# Patient Record
Sex: FEMALE | Race: WHITE
[De-identification: names, ages, dates, MRNs, and addresses within clinical notes are randomized per-mention and may not be internally consistent; named-entity substitution may affect disease eponyms.]

---

## 2017-12-29 ENCOUNTER — HOSPITAL ENCOUNTER (OUTPATIENT)
Dept: HOSPITAL 69 - ER | Age: 77
Setting detail: OBSERVATION
LOS: 1 days | Discharge: HOME HEALTH | End: 2017-12-30
Attending: INTERNAL MEDICINE | Admitting: INTERNAL MEDICINE
Payer: MEDICARE

## 2017-12-29 LAB
ALBUMIN   *: 3.3 GM/DL (ref 3.4–5)
ALKALINE PHOSPHATASE     *: 97 U/L (ref 50–170)
ALT SERPL-CCNC: 30 U/L (ref 19–67)
ANION GAP SERPL CALC-SCNC: 11 MMOL/L (ref 6.8–13.8)
APTT BLD: 22.7 SECONDS (ref 24–32)
AST SERPL-CCNC: 35 U/L (ref 0–48)
BILIRUB SERPL-MCNC: 0.4 MG/DL (ref 0–1.1)
BUN SERPL-MCNC: 23 MG/DL (ref 3–23)
BUN/CREAT SERPL: 20.4 (ref 9–21.6)
CA. CORRECTED FOR ALBUMIN: 9.8 MG/DL (ref 8.4–10.2)
CALCIUM       *: 9.6 MG/DL (ref 7.9–10.9)
CHLORIDE SERPL-SCNC: 103 MMOL/L (ref 97–106)
CO2 SERPL-SCNC: 28.9 MMOL/L (ref 24–32.6)
GLUCOSE   *: 126 MG/DL (ref 70–110)
HCT VFR BLD CALC: 40.7 % (ref 37–47)
HGB BLD-MCNC: 13.5 GM/DL (ref 12.5–16)
INR PPP: 0.94 INR (ref 0.9–1.1)
MCH RBC QN AUTO: 31.8 PG (ref 27–31)
NRBC#: 0 K/MM3 (ref 0–1)
PLATELET # BLD: 240 K/MM3 (ref 150–450)
POTASSIUM SERPL-SCNC: 3.9 MMOL/L (ref 3.4–4.6)
PROT SERPL-MCNC: 7.5 GM/DL (ref 6.2–8.2)
RBC # BLD AUTO: 4.25 M/MM3 (ref 4.2–5.4)
SODIUM SERPL-SCNC: 139 MMOL/L (ref 132–142)
TROPONIN I SERPL-MCNC: 0.02 NG/ML (ref 0–0.1)
WBC # BLD AUTO: 12.1 K/MM3 (ref 4–10.5)

## 2017-12-29 RX ADMIN — ACETAMINOPHEN SCH MG: 500 TABLET ORAL at 20:05

## 2017-12-29 RX ADMIN — OXYBUTYNIN CHLORIDE SCH MG: 5 TABLET ORAL at 16:46

## 2017-12-29 NOTE — ERNOTE
Chest Pain/Cardiac HPI


Date of Service: 17


Chief Complaint: Chest Pain


Time Seen by Provider: 17 10:01


Source: patient


Immunizations: 





 IMMUNIZATION HX





History of Influenza Vaccine     Yes


Hx Pneumococcal Vaccination      Yes








Allergies/Adverse Reactions: 


Allergies





ciprofloxacin [From Cipro] Allergy (Unknown, Verified 17 09:59)


 


ciprofloxacin HCl [From Cipro] Allergy (Unknown, Verified 17 09:59)


 


sulfamethoxazole [From Bactrim] Allergy (Unknown, Verified 17 09:59)


 


trimethoprim [From Bactrim] Allergy (Unknown, Verified 17 09:59)


 


Sulfa (Sulfonamide Antibiotics) Allergy (Verified 17 09:59)


 








Home Medications: 


HOME MEDICATIONS





Oxybutynin Chloride 5 mg PO TID 13 [Last Taken Unknown]


Aspirin 81 mg PO DAILY 16 [Last Taken Unknown]


Atorvastatin Calcium 40 mg PO DAILY 16 [Last Taken Unknown]


Clopidogrel Bisulfate 75 mg PO DAILY 16 [Last Taken Unknown]


Doc-Q-Lace 100 mg PO BID 16 [Last Taken Unknown]


Oyster Shell Calcium-Magnes Tb 1,250 mg PO DAILY 16 [Last Taken Unknown]


Pantoprazole Sodium 40 mg PO DAILY 16 [Last Taken Unknown]


Tylenol 500 mg PO TID 16 [Last Taken Unknown]


Nitroglycerin [Nitrostat] 0.4 mg SL Q5MIN PRN 17 [Last Taken Unknown]








Narrative: 





chest pain 





Date (Duration): 17


Timing: constant


Severity/Quality: severe


Location: substernal, central


Chest Pain Radiation: no radiation


Activities at Onset: rest


Modifying Factors - Improves: Present: other - tylenol was of no help 


Modifying Factors - Worsens: Present: nitroglycerin, other - helped 


Nitro Today/Relief: 0.4 mg x 1, provided by ED, complete relief


Aspirin Treatment Today: no aspirin today - patient is on blood thinner 





Review of Systems





- Review of Systems


Constitutional: Present: no symptoms reported


EYE: Present: no symptoms reported


ENT: Present: no symptoms reported


Respiratory: Present: no symptoms reported


Cardiology: Present: chest pain - anterior chest 


Gastrointestinal/Abdominal: Present: no symptoms reported, other - has chronic 

mild incontinence, no dysuria 


Genitourinary: Present: no symptoms reported


Musculoskeletal: Present: no symptoms reported


Skin: Present: no symptoms reported


Neurological: Present: See HPI, other - has hx of Stroke with residual weaknes 

of lower ext and needs walker and wheelchair to get around at home 


Endocrine: Present: no symptoms reported


Hematologic/Lymphatic: Present: easy bruising, other - currently on blood 

thinner 


Psych: Present: anxiety, other - hx of drinking beer daily 


All Other Systems: All systems neg except as marked





- Narrative


Narrative: 





all reviewed today , pmh, psh, allergies, meds, sh, and fam hx. 





- Patient's Past Medical History


Patient History - Medical: Other - hx of stroke in 2016 with residual weakness 


Patient History - Cardiac/Respiratory: COPD, Valvular Heart Disease - chronic 

tobacco 1ppd daily greater than 50 years


Patient History - Cancer: No Hx of Cancer


Patient History - Surgical Procedures: Colonoscopy, T & A


Patient History - Other: None


LMP (females 10-50): Menopausal





- Family History


  ** Sister


Family History - Medical: Other - mother  of pulmonary embolus 





- Social History


Living Situations: home


Psych History: No pertinent hx


Alcohol Use: none


Drug Use: none





- Immunizations


Hx Pneumococcal Vaccination: Yes


History of Influenza Vaccine: Yes





Physical Exam





- Physical Exam


General Appearance: Present: wd/wn, alert


Head Exam: Present: normal inspection, no evidence of injury, no tenderness w 

palpation


Ears, Nose, Throat: Present: normal ENT inspection, normal pharynx


Neck: Present: normal inspection, nontender


Respiratory: Present: no respiratory distress, decreased breath sounds, rhonchi


Cardiovascular/Chest: Present: regular rate, rhythm, no murmur, normal 

peripheral pulses


Peripheral Pulses: N=norm/S=strong/W=weak/B=bound/A=absent: Carotid (R): Normal

, Carotid (L): Normal, Radial (R): Normal, Radial (L): Normal


Gastrointestinal/Abdominal: Present: normal bowel sounds, nontender, 

nondistended


Rectal Exam: Present: deferred


Pelvic Exam: Present: deferred


Back Exam: Present: normal inspection, normal range of motion, no vertebral 

tenderness


Extremity Exam: Present: normal inspection, non-tender


Neurological Exam: Present: alert, oriented, normal mood/affect, motor weakness 

- right lower ext 


Skin Exam: Present: normal color, warm/dry


Lymphatic Exam: Present: no adenopathy





ED Progress





- Results and Orders


Patient's Lab Results:: I have reviewed the patient's lab results.


Results and Orders: 





 Laboratory Tests











  17





  10:00 10:00 10:00


 


WBC  12.1 H  


 


RBC  4.25  


 


Hgb  13.5  


 


Hct  40.7  


 


MCV  95.8  


 


MCH  31.8 H  


 


MCHC  33.2  


 


RDW  14.4 H  


 


Plt Count  240  


 


MPV  10.4 H  


 


Immature Gran % (Auto)  0.20  


 


Immature Gran # (Auto)  0.03  


 


Neutrophils %  78.1 H  


 


Lymphocytes %  10.2 L  


 


Monocytes %  10.6 H  


 


Eosinophils %  0.7  


 


Basophils %  0.2  


 


Nucleated RBC %  0.0  


 


Neutrophils #  9.4 H  


 


Lymphocytes #  1.2 L  


 


Monocytes #  1.3 H  


 


Eosinophils #  0.1  


 


Absolute Basophils  0.0  


 


PT   9.4 


 


INR (Anticoag Therapy)   0.94 


 


PTT ([place])   22.7 L 


 


Sodium    139


 


Plasma Sodium    139


 


Potassium    3.9


 


Chloride    103


 


Carbon Dioxide    28.9


 


Anion Gap    11.0


 


BUN    23


 


Creatinine    1.13


 


Est GFR (Non-Af Amer)    50 L


 


BUN/Creatinine Ratio    20.4


 


Random Glucose    126 H


 


Calcium    9.6


 


Calcium Adj for Albumin    9.8


 


Total Bilirubin    0.4


 


AST    35


 


ALT    30


 


Alkaline Phosphatase    97


 


Troponin I    0.020


 


B-Natriuretic Peptide    2030 H


 


Total Protein    7.5


 


Albumin    3.3 L














- Vital Signs


Patient's Vital Signs:: I have reviewed the patient's vital signs.


Vital Signs: 





 Vital Signs











  17





  09:51


 


Temperature 36.4 C L


 


Pulse Rate 85


 


Respiratory 22 H





Rate 


 


Blood Pressure 142/80


 


O2 Sat by Pulse 98





Oximetry 














- EKG


EKG: NSR





- X-Ray


  ** X-Ray #1


X-Ray: chest


Interpretation: Reviewed by me


X-ray Comments: 





 [facility]


                                                PATIENT  RADIOLOGY  STUDY  

REPORT


========================================================================


Patient  MRN:  [id number] Patient  Name:GILMA HALEY


  YOB: 1940 Sex:  F


                              


            Order  Number:  [id number]              Unique  Exam  ID:  [id number]


        Exam  Requested:  CXRPALAT  -  Chest  PA      Lateral                  *


        Date  Scheduled:  2017  10:25  AM                Study  Priority:

  





Requesting  Service:          Requesting  Physician:  Reyes, Pascuala





      Reason  for  Exam:  Chest  Pain





Radiological  Report  :  


---------------------------------------------------------------------





  


  [place] [address]    


  Phone:  [phone]    





NAME:                  GILMA HALEY    :    1940      


ACCT  #:              [id number]    MR  #:  [id number]      


    CC:                      Pascuala Reyes DO  LOC:    ER    


ADM  DATE:                  





_____________________________________________________________________________  

  


  X-RAY  REPORT    


    [id number]  RAD/Chest  PA      Lateral                  *      


Exam  Date:  2017  10:25    


  Ordering  Physician:  Pascuala Reyes      





  HISTORY:      


  Chest  Pain      


  Additional  history  from  technologist:  Chest  pain  getting  worse  in  

the  last  couple  days.    





  TECHNIQUE:      


  PA  and  lateral  views  of  the  chest  were  obtained.  2  images.    





  COMPARISONS:      


  2016    





  FINDINGS:    





  Chest  PA      Lateral                  *    





  Hyperinflated  lung  volumes,  with  hyperlucent  lungs.        


  No  consolidation  or  mass.        


  No  pneumothorax  or  pleural  fluid  collections.        


  Cardiac  size  within  normal  limits,  and  overall  grossly  unchanged.  

Mild  tortuosity  of  the  thoracic  


aorta,  stable.  Patient  has  a  large  hiatal  hernia  with  a  significant  

portion  of  the  stomach  


projecting  posterior  to  the  heart.        


  Trachea  is  in  normal  position.        


  Bones  show  degenerative  changes  of  the  spine.      


  Decreased  mineralization  of  bone  suggestive  of  underlying  osteopenia  

or  osteoporosis.    





  IMPRESSION:    


  1.    Findings  compatible  with  acute  or  chronic  bronchitis  versus  

reactive  airways  disease.  Also  


consider  COPD/emphysema,  if  the  patient  has  history  of  smoking.    


  2.    Large  hiatal  hernia.    


  3.    Additional  comments  are  as  above.    





  Electronically  signed  by  Maik Osman M.D..    





Maik Osman MD    





Dict:        17  1028    


Typed:      17  1028/    





    17    1030    


    17    1033    








- CT/Ultrasound


CT/Ultrasound Narrative: 





CTA :   [facility]


                                                PATIENT  RADIOLOGY  STUDY  

REPORT


========================================================================


Patient  MRN:  [id number] Patient  Name:GILMA HALEY


  YOB: 1940 Sex:  F


                              


            Order  Number:  [id number]              Unique  Exam  ID:  [id number]


        Exam  Requested:  ANGIOCHES  -  CTA  Chest


        Date  Scheduled:  2017  12:54  PM                Study  Priority:

  





Requesting  Service:          Requesting  Physician:  Reyes, Pascuala





      Reason  for  Exam:  rule  out  pe  





Radiological  Report  :  


---------------------------------------------------------------------





  


  [place] [address]    


  Phone:  [phone]    





NAME:                  GILMA HALEY    :    1940      


ACCT  #:              [id number]    MR  #:  [id number]      


    CC:                      Pascuala Reyes DO  LOC:    ER    


ADM  DATE:                  





_____________________________________________________________________________  

  


  X-RAY  REPORT    


    [id number]  CT/CTA  Chest      


Exam  Date:  2017  12:54    


  Ordering  Physician:  Pascuala Reyes      





  HISTORY:      


  Chest  pain.    





  TECHNIQUE:      


  Multiple  thin-section  contrast-enhanced  axial  CT  images  of  the  chest  

were  obtained  after  rapid  


infusion  of  intravenous  contrast  material,  according  to  pulmonary  

angiography  protocol.  Coronal  


maximal  intensity  projection  (MIP)  images  were  also  submitted  for  

interpretation.      





  Dose  reduction  techniques  using  the  adjustment  of  the  mA  and/or  kV  

according  to  patient  size  and/


or  use  of  AEC  or  iterative  reconstruction  were  used  in  the  

acquisition  of  this  exam.    





  COMPARISONS:  2015.    





  FINDINGS:    





  CTA  Chest:      





  Pulmonary  Arteries:    


  Diagnostic  Quality  (for  pulmonary  arteries):  Opacification  of  the  

pulmonary  arterial  branches  is  


adequate.    The  breath  hold  is  adequate.  There  is  streak  artifact  

from  contrast  material  within  the  


superior  vena  cava,  which  affects  the  adjacent  pulmonary  arteries.    


  No  definite  filling  defects  are  noted  to  suggest  pulmonary  

thromboembolism.    


  No  definite  axial  CT  image  findings  of  right  heart  strain.    





  Aorta:  Opacification  of  the  thoracic  aorta  is  suboptimal  for  

angiographic  evaluation  but  no  


definite  focal  finding  is  seen.  Multiple  artifacts  related  to  cardiac  

motion  and  streak  artifact  


from  contrast  in  the  adjacent  superior  vena  cava  noted.    





  Mediastinum:  No  mediastinal  mass  or  lymphadenopathy  noted.  Incidental  

note  of  a  large  hiatal  hernia


,  with  most  of  the  stomach  seen  within  the  chest.    





  Heart:  No  significant  pericardial  effusion  noted.  There  is  mild  to  

moderate  cardiomegaly  noted,  


with  cardiac  chamber  enlargement,  especially  the  left  and  right  atria.

    





  Lung  Parenchyma:  No  new  consolidation  or  focal  mass  identified.  

Emphysematous  changes  and  


bilateral  basilar  opacities  noted  suggestive  of  atelectasis.  There  is  

a  peripheral  2  x  2  cm  


scarlike  opacity  in  the  medial  segment  of  the  right  middle  lobe,  

stable.    





  Central  Airways:  The  trachea  and  bronchi  grossly  patent.    





  Pleural  Spaces:  No  pneumothorax  or  pleural  effusions  present.    





  Bones:  There  is  a  new  T7  compression  fracture,  with  no  definite  

signs  of  retropulsion  of  the  


posterior  cortex.  There  is  underlying  osteopenia/osteoporosis  suggested.  

Degenerative  changes  of  


the  thoracolumbar  spine  noted.    





  Chest  Wall/Axillae:  Anterior  chest  wall  is  grossly  unremarkable.  

Axillary  regions  are  also  grossly  


normal.    





  Upper  Abdomen:  Visualized  portions  of  the  abdomen  demonstrates  

probable  cysts  of  the  liver,  


partially  visualized,  but  grossly  stable.  Bilateral  low  density  nodular

  lesions  of  the  adrenal  


glands,  also  stable  most  likely  adenomas.    





  IMPRESSION:      


  1.  Negative  for  acute  pulmonary  thromboembolism.    


  2.  Suboptimal  opacification  of  the  thoracic  aorta  for  angiographic  

evaluation  without  obvious  


acute  findings.  Potential  subtle  aortic  intimal  tear/dissection  cannot  

be  entirely  excluded.    


  3.  Emphysematous  changes  of  the  lungs.    


  4.  Stable  scarlike  opacity  of  the  medial  segment  right  middle  lobe.

  Consider  continued  follow-up/


surveillance.    


  5.  Cardiomegaly,  with  enlargement  of  the  cardiac  chambers,  especially

  the  atria.    


  6.  T7  compression  fracture,  of  indeterminate  age.  It  is  new  since  

.  Correlate  clinically  for  


acute  versus  subacute/chronic  fracture  as  a  potential  cause  of  chest  

pain/back  pain.  Consider  


insufficiency  versus  pathologic  fracture.  Consider  further  evaluation  by

  nonemergent  thoracic  spine  


MRI.    


  7.  Large  hiatal  hernia  with  most  of  the  stomach  within  the  chest. 

   


  8.  Additional  comments  are  as  above.    





  Electronically  signed  by  Maik Osman M.D..    





Maik Osman MD    





Dict:        17  1257    


Typed:      17  1257/    





    17    1311    


    17    1314    

















,        





------------------------------------------------------------------------


                              Approved  by:  MAIK OSMAN


                          Approval  Date:  2017  


                          Approval  Time:  12:57  PM  





THIS  REPORT  WAS  RECEIVED  FROM  THE  [facility]  [facility]


-----------------------------------------------------------------------





- Progress/Reassessment


Chief Complaint: Chest Pain


Progress:: Improved - down to just a discomfort





- Transfer of Care


Receiving Physician: Lucia Pardo


Pending Results: CT/MRI results - cta NEG FOR PE 


Expected Disposition: Admit





Plan





- Plan


Plan: 





Patient is high risk for PE, strong family hx and will need admission for chest 

pain rule out 





Patient is pending a CTA for rule out of PE, high risk patient 2 day hx of sob 

and smokes 1ppd 





cta is neg for PE but she has a thoracic compression fracture at t5 which is 

new since  and 


patient was notified of this finding on ct, and that there is a spot on her 

lungs concerning that will need followup 


as an outpatient 





Departure


Clinical Impression: 


Chest pain


Qualifiers:


 Chest pain type: chest pain on breathing Qualified Code(s): R07.1 - Chest pain 

on breathing





Thoracic compression fracture


Qualifiers:


 Encounter type: sequela Fracture type: closed Qualified Code(s): S22.000S - 

Wedge compression fracture of unspecified thoracic vertebra, sequela





COPD (chronic obstructive pulmonary disease)


Qualifiers:


 COPD type: emphysema Emphysema type: panlobular Qualified Code(s): J43.1 - 

Panlobular emphysema








- Departure


Disposition: [facility]


Condition: Good

## 2017-12-29 NOTE — HP
Chief Complaint





- Chief Complaint


Date of Service: 17


Time of Service: 16:17


Chief Complaint: chest pain/back pain/neck pain


History of Present Illness: 





Tiffanie Mcgarry, is a 77-year-old white female, patient of Dr. Adams, with 

previous medical history of CVA, nonruptured cerebral aneurysm, degenerative 

disc disease, hypertension, hyperlipidemia, chronic smoking, who was admitted 

on 2017 because of sudden acute chest pain, back pain and neck pain.  One 

day prior to admission the patient was in her usual self until she suddenly 

developed chest pain, back pain, neck pain, and shoulder pain. She said it was 

over more than 10 over 10,  nonrelenting, stabbing in character,  not increased 

with activity, not associated with N/V.  She took her usual Tylenol arthritis 

and he did not give any relief and so she went or emergency room today. Her EKg 

showed NSR, troponin was WNL, CXR showed no acute cardiopulmonary findings 

except for hiatal hernia and COPD changes. She was then admitted for further 

workup. Her CTA did not show P.E. or dissection but showed T 7 compression 

fracture of indeterminate age. She deneid any h/o trauma. 





- Patient's Past Medical History


Patient History - Medical: Other - hx of stroke in 2016 with residual weakness 


Patient History - Cardiac/Respiratory: COPD, Valvular Heart Disease - chronic 

tobacco 1ppd daily greater than 50 years


Patient History - Cancer: No Hx of Cancer


Patient History - Surgical Procedures: Colonoscopy, T & A


Patient History - Other: None


LMP (females 10-50): Menopausal





- Family History


  ** Sister


Family History - Medical: Other - mother  of pulmonary embolus 


Family History - Cardiac/Respiratory: Asthma, Hypertension


Family History - Cancer: No pertinent family hx





  ** Mother


Family History - Medical: , No pertinent hx


Family History - Cardiac/Respiratory: Pulmonary Embolism


Family History - Cancer: No pertinent family hx





  ** Father


Family History - Medical: , Diabetes Type 2 Insulin Dependent


Family History - Cardiac/Respiratory: History Unknown


Family History - Cancer: No pertinent family hx





- Social History


Living Situations: home


Psych History: No pertinent hx


Smoking Status: Current every day smoker


Have you smoked in the past 12 months: Yes


Do you dip or chew tobacco: No


Smoking Start Date: 62


Patient requests Smoking Cessation Consult: No


Initiate information on Smoking Cessation: Yes


Alcohol Use: none


Drug Use: none





- Immunizations


Hx Pneumococcal Vaccination: Yes


History of Influenza Vaccine: Yes





Review Of Systems (GEN)





- Review of Systems


Generalized/Overall Review: Absent: Weakness, Chills, Fever


Respiratory: Present: Cough, Shortness of Breath


Cardiac: Present: Chest Pain.  Absent: Edema, Palpitations


Abdominal: Absent: Nausea, Vomiting


Genitourinary: Absent: Urgency, Frequency


Musculoskeletal: Present: Joint Pain - shoulder, Back Pain, Neck Pain


Immunizations: 


 IMMUNIZATION HX





History of Influenza Vaccine     Yes


Hx Pneumococcal Vaccination      Yes








Allergies/Adverse Reactions: 


 Allergies











Allergy/AdvReac Type Severity Reaction Status Date / Time


 


ciprofloxacin [From Cipro] Allergy Unknown  Verified 17 16:11


 


ciprofloxacin HCl Allergy Unknown  Verified 17 16:11





[From Cipro]     


 


sulfamethoxazole Allergy Unknown  Verified 17 16:11





[From Bactrim]     


 


trimethoprim [From Bactrim] Allergy Unknown  Verified 17 16:11


 


Sulfa (Sulfonamide Allergy   Verified 17 16:11





Antibiotics)     











Home Medications: 


HOME MEDICATIONS





Oxybutynin Chloride 5 mg PO TID 13 [Last Taken Unknown]


Acetaminophen [Tylenol] 500 mg PO BID 17 [Last Taken Unknown]


Aspirin [Aspirin EC] 81 mg PO DAILY 17 [Last Taken Unknown]


Atorvastatin Calcium [Lipitor] 40 mg PO HS 17 [Last Taken Unknown]


Calcium Carbonate/Vitamin D3 [Oyster Shell Calcium-Vit D Tab] 1 each PO DAILY  [Last Taken Unknown]


Clopidogrel Bisulfate [Plavix] 75 mg PO DAILY 17 [Last Taken Unknown]


Docusate Sodium [Doc-Q-Lace] 100 mg PO BID PRN 17 [Last Taken Unknown]


Multivitamin [Multivitamins] 1 tab PO DAILY 17 [Last Taken Unknown]


Pantoprazole Sodium 40 mg PO DAILY 17 [Last Taken Unknown]











Exam





- Exam


Vital Signs: 


 Vital Signs - Last Taken











Temp  36.7 C   17 14:40


 


Pulse  93   17 14:40


 


Resp  20   17 14:40


 


BP  156/74   17 14:40


 


Pulse Ox  97   17 14:40











Constitutional: Present: Alert, Oriented x3, Cooperative, Elderly, Thin and 

frail


ENT Exam: Present: hearing grossly normal


Eye Exam: bilateral eye: normal inspection, PERRL, EOMI


Neck: Present: supple


Back Exam: Present: vertebral tenderness


Respiratory: Present: decreased breath sounds, No rales, No wheezing


Cardiovascular/Chest: Present: regular rate, rhythm, no JVD, no murmur


Abdomen: Present: Normal bowel sounds, soft, nontender, nondistended


Extremity: Present: no pedal edema, no calf tenderness


Diagnostic Studies: 


 Laboratory Results











WBC  12.1 K/mm3 (4.0-10.5)  H  17  10:00    


 


RBC  4.25 M/mm3 (4.2-5.4)   17  10:00    


 


Hgb  13.5 gm/dL (12.5-16.0)   17  10:00    


 


Hct  40.7 % (37.0-47.0)   17  10:00    


 


MCV  95.8 fl ()   17  10:00    


 


MCH  31.8 pg (27-31)  H  17  10:00    


 


MCHC  33.2 g/dl (32-36)   17  10:00    


 


RDW  14.4 % (11.5-14.0)  H  17  10:00    


 


Plt Count  240 K/mm3 (150-450)   17  10:00    


 


MPV  10.4 fl (6.0-9.5)  H  17  10:00    


 


Immature Gran % (Auto)  0.20 % (0.001-0.429)   17  10:00    


 


Immature Gran # (Auto)  0.03 K/mm3 (0.000-0.0310)   17  10:00    


 


Neutrophils %  78.1 % (42-75.0)  H  17  10:00    


 


Lymphocytes %  10.2 % (20-51)  L  17  10:00    


 


Monocytes %  10.6 % (0.0-9)  H  17  10:00    


 


Eosinophils %  0.7 % (0.0-3.0)   17  10:00    


 


Basophils %  0.2 % (0.0-1.0)   17  10:00    


 


Nucleated RBC %  0.0 k/mm3 (0-1)   17  10:00    


 


Neutrophils #  9.4 K/mm3 (1.3-6.0)  H  17  10:00    


 


Lymphocytes #  1.2 k/mm3 (1.5-3.5)  L  17  10:00    


 


Monocytes #  1.3 k/mm3 (0.0-1.0)  H  17  10:00    


 


Eosinophils #  0.1 k/mm3 (0.0-0.7)   17  10:00    


 


Absolute Basophils  0.0 k/mm3 (0.0-0.1)   17  10:00    


 


PT  9.4 Seconds (9.0-11.0)   17  10:00    


 


INR (Anticoag Therapy)  0.94 INR (0.90-1.10)   17  10:00    


 


PTT (Treva)  22.7 Seconds (24-32)  L  17  10:00    


 


Sodium  139 mmol/L (132-142)   17  10:00    


 


Plasma Sodium  139 mmol/L (130-142)   17  10:00    


 


Potassium  3.9 mmol/L (3.4-4.6)   17  10:00    


 


Chloride  103 mmol/L ()   17  10:00    


 


Carbon Dioxide  28.9 mmol/L (24-32.6)   17  10:00    


 


Anion Gap  11.0 mmol/L (6.8-13.8)   17  10:00    


 


BUN  23 mg/dL (3-23)   17  10:00    


 


Creatinine  1.13 mg/dL (0.4-1.4)   17  10:00    


 


Est GFR (Non-Af Amer)  50 mL/min ()  L  17  10:00    


 


BUN/Creatinine Ratio  20.4  (9.0-21.6)   17  10:00    


 


Random Glucose  126 mg/dL ()  H  17  10:00    


 


Calcium  9.6 mg/dL (7.9-10.9)   17  10:00    


 


Calcium Adj for Albumin  9.8 mg/dL (8.4-10.2)   17  10:00    


 


Total Bilirubin  0.4 mg/dL (0.0-1.1)   17  10:00    


 


AST  35 U/L (0-48)   17  10:00    


 


ALT  30 U/L (19-67)   17  10:00    


 


Alkaline Phosphatase  97 U/L ()   17  10:00    


 


Troponin I  0.020 ng/ml (0.00-0.10)   17  10:00    


 


B-Natriuretic Peptide  2030 pg/mL (5-550)  H  17  10:00    


 


Total Protein  7.5 gm/dL (6.2-8.2)   17  10:00    


 


Albumin  3.3 gm/dl (3.4-5.0)  L  17  10:00    














Assessment/Plan





- Assessment/Plan


(1) Chest pain


Assessment: 


likely due to T7 compression fracture as associated with back, neck pain, 

shoulder pain. will follow up with a second troponin and EKG. it is possible 

that it can be due to her severe hiatal hernia but less likely. no clear cut 

aortic dissection.


Problem: Acute   


Qualifiers: 


   Chest pain type: unspecified   Qualified Code(s): R07.9 - Chest pain, 

unspecified   





(2) Thoracic compression fracture


Assessment: 


T7 likely pathological from osteoporosis. will do MRI for age determination. 


Problem: Acute   


Qualifiers: 


   Encounter type: sequela   Fracture type: closed   Qualified Code(s): 

S22.000S - Wedge compression fracture of unspecified thoracic vertebra, sequela

   





(3) Osteoporosis


Problem: Chronic   





(4) Elevated brain natriuretic peptide (BNP) level


Assessment: 


likely due to her COPD


Problem: Acute   





(5) Hiatal hernia


Problem: Acute   





(6) Aneurysm, cerebral, nonruptured


Problem: Chronic   





(7) Hypertension


Problem: Chronic   


Qualifiers: 


   Hypertension type: essential hypertension   Qualified Code(s): I10 - 

Essential (primary) hypertension   





(8) Hyperlipidemia


Problem: Chronic   





(9) PAD (peripheral artery disease)


Problem: Chronic   





(10) History of CVA in adulthood


Problem: Chronic

## 2017-12-30 VITALS — DIASTOLIC BLOOD PRESSURE: 77 MMHG | SYSTOLIC BLOOD PRESSURE: 123 MMHG

## 2017-12-30 RX ADMIN — OXYBUTYNIN CHLORIDE SCH MG: 5 TABLET ORAL at 08:28

## 2017-12-30 RX ADMIN — HYDROCODONE BITARTRATE AND ACETAMINOPHEN PRN EACH: 5; 325 TABLET ORAL at 14:28

## 2017-12-30 RX ADMIN — HYDROCODONE BITARTRATE AND ACETAMINOPHEN PRN EACH: 5; 325 TABLET ORAL at 06:52

## 2017-12-30 RX ADMIN — OXYBUTYNIN CHLORIDE SCH MG: 5 TABLET ORAL at 14:29

## 2017-12-30 RX ADMIN — ACETAMINOPHEN SCH: 500 TABLET ORAL at 08:29

## 2017-12-30 NOTE — DS
(1) Chest pain


Diagnosis(s): 


AMI ruled out.


Problem: Acute   


  QualifierTitle:    Chest pain type: unspecified   Qualified Code(s): R07.9 - 

Chest pain, unspecified   





(2) Thoracic compression fracture


Diagnosis(s): 


T7 old compression


Problem: Acute   


  QualifierTitle:    Encounter type: sequela   Fracture type: closed   

Qualified Code(s): S22.000S - Wedge compression fracture of unspecified 

thoracic vertebra, sequela   





(3) Osteoporosis


Problem: Chronic   





(4) Elevated brain natriuretic peptide (BNP) level


Problem: Acute   





(5) Hiatal hernia


Problem: Acute   





(6) Aneurysm, cerebral, nonruptured


Problem: Chronic   





(7) Hypertension


Problem: Chronic   


  QualifierTitle:    Hypertension type: essential hypertension   Qualified Code(

s): I10 - Essential (primary) hypertension   





(8) Hyperlipidemia


Problem: Chronic   





(9) PAD (peripheral artery disease)


Problem: Chronic   





(10) History of CVA in adulthood


Problem: Chronic   


Description of Stay: 


Tiffanie Mcgarry, is a 77-year-old white female, patient of Dr. Adams, with 

previous medical history of CVA, nonruptured cerebral aneurysm, degenerative 

disc disease, hypertension, hyperlipidemia, chronic smoking, who was admitted 

on 12/29/2017 because of sudden acute chest pain, back pain and neck pain.  One 

day prior to admission the patient was in her usual self until she suddenly 

developed chest pain, back pain, neck pain, and shoulder pain. She said it was 

over more than 10 over 10,  nonrelenting, stabbing in character,  not increased 

with activity, not associated with N/V.  She took her usual Tylenol arthritis 

and he did not give any relief and so she went or emergency room today. Her EKg 

showed NSR, troponin was WNL, CXR showed no acute cardiopulmonary findings 

except for hiatal hernia and COPD changes. She was then admitted for further 

workup. Her CTA did not show P.E. or dissection but showed T 7 compression 

fracture of indeterminate age. She denied any h/o trauma. We did an MRI and it 

showed no edema and likely an old fracture. Her pain was better controlled with 

Norco. She was ruled out for AMI. Will discharge patient today and will leave 

it up to her PCP if he wants to schedule an outpatient stress test. 








Procedures Performed: none


Discharge Disposition: Home self care


Disposition: Home self-care


Condition: Good


Discharge Diet: Low salt


Problem Oriented Discharge Instructions to Patient/Family:  Spinal Compression 

Fracture, Chest Pain Observation


Additional Patient Instructions (free text): 


TCM appointment unless nursing home discharge. Thank you! Jazmyne @ [phone]. 

Follow up with her PCP- Dr Adams next week.1-9-18 @ 1:00pm.


Has [facility] ongoing, please call and fax discharge orders to 

[facility].


Prescriptions (Any new or edited meds): 


HYDROcodone/ACETAMINOPHEN [Norco 5-325] 1 each PO Q4H PRN #30 tablet


 PRN Reason: Moderate Pain (Pain Scale 4-6)


Complete Home Medications List: 


Complete Home Medication List:





Oxybutynin Chloride 5 mg PO TID 01/03/13 


Acetaminophen [Tylenol] 500 mg PO BID 12/29/17 


Aspirin [Aspirin EC] 81 mg PO DAILY 12/29/17 


Atorvastatin Calcium [Lipitor] 40 mg PO HS 12/29/17 


Calcium Carbonate/Vitamin D3 [Oyster Shell Calcium-Vit D Tab] 1 each PO DAILY 12 /29/17 


Clopidogrel Bisulfate [Plavix] 75 mg PO DAILY 12/29/17 


Docusate Sodium [Doc-Q-Lace] 100 mg PO BID PRN 12/29/17 


Multivitamin [Multivitamins] 1 tab PO DAILY 12/29/17 


Pantoprazole Sodium 40 mg PO DAILY 12/29/17 


HYDROcodone/ACETAMINOPHEN [Norco 5-325] 1 each PO Q4H PRN #30 tablet 12/30/17

## 2018-03-12 ENCOUNTER — HOSPITAL ENCOUNTER (INPATIENT)
Dept: HOSPITAL 69 - ER | Age: 78
LOS: 4 days | Discharge: NURSING, REHAB OR LONG-TERM CARE | DRG: 175 | End: 2018-03-16
Attending: INTERNAL MEDICINE | Admitting: FAMILY MEDICINE
Payer: MEDICARE

## 2018-03-12 LAB
ALBUMIN   *: 3.4 GM/DL (ref 3.4–5)
ALKALINE PHOSPHATASE     *: 110 U/L (ref 50–170)
ALT SERPL-CCNC: 59 U/L (ref 19–67)
ANION GAP SERPL CALC-SCNC: 16.7 MMOL/L (ref 6.8–13.8)
AST SERPL-CCNC: 33 U/L (ref 0–48)
BILIRUB SERPL-MCNC: 0.3 MG/DL (ref 0–1.1)
BUN SERPL-MCNC: 22 MG/DL (ref 3–23)
BUN/CREAT SERPL: 22.2 (ref 9–21.6)
CA. CORRECTED FOR ALBUMIN: 9.7 MG/DL (ref 8.4–10.2)
CALCIUM       *: 9.5 MG/DL (ref 7.9–10.9)
CHLORIDE SERPL-SCNC: 106 MMOL/L (ref 97–106)
CO2 SERPL-SCNC: 27.7 MMOL/L (ref 24–32.6)
GLUCOSE   *: 123 MG/DL (ref 70–110)
HCT VFR BLD CALC: 42.2 % (ref 37–47)
HGB BLD-MCNC: 14 GM/DL (ref 12.5–16)
MCH RBC QN AUTO: 31.5 PG (ref 27–31)
NRBC#: 0 K/MM3 (ref 0–1)
PLATELET # BLD: 292 K/MM3 (ref 150–450)
POTASSIUM SERPL-SCNC: 4.4 MMOL/L (ref 3.4–4.6)
PROT SERPL-MCNC: 7.2 GM/DL (ref 6.2–8.2)
RBC # BLD AUTO: 4.45 M/MM3 (ref 4.2–5.4)
SODIUM SERPL-SCNC: 146 MMOL/L (ref 132–142)
WBC # BLD AUTO: 11.7 K/MM3 (ref 4–10.5)

## 2018-03-12 NOTE — ERNOTE
Dyspnea





- General


Presenting Symptoms: shortness of breath


Time Seen by Provider: 18 20:00


Source: patient


Exam Limitations: no limitations





- Immun/Allergies/Home Medications


Immunizations: 





 IMMUNIZATION HX





Immunizations Up to Date         Yes


History of Influenza Vaccine     Yes


Hx Pneumococcal Vaccination      Yes








Allergies/Adverse Reactions: 


Allergies





ciprofloxacin [From Cipro] Allergy (Unknown, Verified 17 16:11)


 


ciprofloxacin HCl [From Cipro] Allergy (Unknown, Verified 17 16:11)


 


sulfamethoxazole [From Bactrim] Allergy (Unknown, Verified 17 16:11)


 


trimethoprim [From Bactrim] Allergy (Unknown, Verified 17 16:11)


 


Sulfa (Sulfonamide Antibiotics) Allergy (Verified 17 16:11)


 








Home Medications: 


HOME MEDICATIONS





Oxybutynin Chloride 5 mg PO TID 13 [Last Taken Unknown]


Acetaminophen [Tylenol] 500 mg PO BID PRN 17 [Last Taken Unknown]


Aspirin [Aspirin EC] 81 mg PO DAILY 17 [Last Taken Unknown]


Atorvastatin Calcium [Lipitor] 40 mg PO HS 17 [Last Taken Unknown]


Calcium Carbonate/Vitamin D3 [Oyster Shell Calcium-Vit D Tab] 1 each PO DAILY  [Last Taken Unknown]


Clopidogrel Bisulfate [Plavix] 75 mg PO DAILY 17 [Last Taken Unknown]


Multivitamin [Multivitamins] 1 tab PO DAILY 17 [Last Taken Unknown]


Pantoprazole Sodium 40 mg PO DAILY 17 [Last Taken Unknown]











- History of Present Illness


Narrative: 





Pt has had shortness of breath off and on for 2 days.  today it continued to 

worsen.


Severity: moderate


Treatment PTA: none


Initiating event: Reports: none


Frequency of episodes: Reports: no prior episodes


Modifying Factors - (Improves): Reports: nothing


Modifying Factors (Worsens): Reports: nothing


Associated Symptoms-Dyspnea: Reports: anxiety - believes this may be due to 

anxiety.  Denies: fever/chills





Review of Systems





- Review of Systems


Constitutional: Absent: recent illness, fever


EYE: Absent: vision changes


ENT: Present: nose congestion - minimal


Respiratory: Present: See HPI


Cardiology: Absent: chest pain


Gastrointestinal/Abdominal: Present: diarrhea - on occasion.  Absent: nausea, 

vomiting


Genitourinary: Absent: frequency, dysuria


Musculoskeletal: Absent: back pain, neck pain


Skin: Absent: rash


Neurological: Present: anxiety.  Absent: numbness, tingling


Endocrine: Absent: excessive sweating





- Patient's Past Medical History


Patient History - Medical: Osteoporosis


Patient History - Cardiac/Respiratory: Aneurysm, COPD, CVA/Stroke, Hypertension

, Hyperlipidemia, Valvular Heart Disease


Patient History - Cancer: No Hx of Cancer


Patient History - Surgical Procedures: Back Surgery, Cataracts, Colonoscopy, T 

& A, Other, Urology


Patient History - Other: None


LMP (females 10-50): Menopausal





- Family History


  ** Sister


Family History - Medical: Other


Family History - Cardiac/Respiratory: Asthma, Hypertension


Family History - Cancer: No pertinent family hx





  ** Mother


Family History - Medical: , No pertinent hx


Family History - Cardiac/Respiratory: Pulmonary Embolism


Family History - Cancer: No pertinent family hx





  ** Father


Family History - Medical: , Diabetes Type 2 Insulin Dependent


Family History - Cardiac/Respiratory: History Unknown


Family History - Cancer: No pertinent family hx





- Social History


Living Situations: home


Psych History: No pertinent hx





- Immunizations


Immunizations Up to Date: Yes


Hx Pneumococcal Vaccination: Yes


History of Influenza Vaccine: Yes





Physical Exam





- Physical Exam


General Appearance: Present: wd/wn, alert, no apparent distress


Head Exam: Present: normal inspection, no evidence of injury


Eye Exam: Normal inspection: bilateral


Ears, Nose, Throat: Present: normal ENT inspection


Neck: Present: normal inspection, nontender


Respiratory: Present: no respiratory distress, normal breath sounds, lungs clear


Cardiovascular/Chest: Present: regular rate, rhythm, no murmur, normal 

peripheral pulses


Gastrointestinal/Abdominal: Present: nondistended


Back Exam: Present: normal range of motion


Extremity Exam: Present: normal inspection, normal range of motion, no edema


Neurological Exam: Present: alert, oriented, normal mood/affect, no motor/

sensory deficits


Skin Exam: Present: normal color, warm/dry


Lymphatic Exam: Present: no adenopathy





ED Progress





- Results and Orders


Patient's Lab Results:: I have reviewed the patient's lab results.


Results and Orders: 





 Laboratory Tests











  18





  20:32 20:32 20:32


 


WBC  11.7 H  


 


Hgb  14.0  


 


Hct  42.2  


 


Plt Count  292  


 


Neutrophils %  78.7 H  


 


D-Dimer    1.50 H


 


Sodium   146 H 


 


Potassium   4.4 


 


Chloride   106 


 


Anion Gap   16.7 H 


 


BUN   22 


 


Creatinine   0.99 


 


Random Glucose   123 H 


 


Calcium   9.5 


 


Total Bilirubin   0.3 


 


AST   33 


 


ALT   59 


 


Alkaline Phosphatase   110 


 


B-Natriuretic Peptide   07866 H 


 


Total Protein   7.2 


 


Albumin   3.4 














- Vital Signs


Patient's Vital Signs:: I have reviewed the patient's vital signs.


Vital Signs: 





 Vital Signs











  18





  19:53


 


Temperature 36.4 C L


 


Pulse Rate 77


 


Respiratory 21 H





Rate 


 


Blood Pressure 167/74


 


O2 Sat by Pulse 97





Oximetry 














- EKG


EKG read: Interp. by me


EKG Comments: 


NSR, incomplete RBBB, left anterior facicular block.   non specific ST-T wave 

changes





- X-Ray


  ** X-Ray #1


X-Ray: chest


Interpretation: Interp. by me


X-ray Comments: 





B/L lower lobe infiltrates. 





- CT/Ultrasound


CT/Ultrasound Narrative: 





CTA chest PE protocol;  PE found in the RLL.  pulmonary edema present. spoke 

with Dr. Roman at 22:10 





- Progress/Reassessment


Chief Complaint: Dyspnea


Progress Note-Subjective: 





18 22:38


Spoke with Dr. Rey and he agrees with admit.  He prefers lovenox for 

anticoagulation tonight





Departure


Clinical Impression: 


Pulmonary embolism


Qualifiers:


 Pulmonary embolism type: other Chronicity: acute Acute cor pulmonale presence: 

without acute cor pulmonale Qualified Code(s): I26.99 - Other pulmonary 

embolism without acute cor pulmonale





Pulmonary edema


Qualifiers:


 Chronicity: acute Qualified Code(s): J81.0 - Acute pulmonary edema








- Departure


Disposition: Still a patient


Condition: Fair

## 2018-03-13 PROCEDURE — B246ZZZ ULTRASONOGRAPHY OF RIGHT AND LEFT HEART: ICD-10-PCS | Performed by: INTERNAL MEDICINE

## 2018-03-13 RX ADMIN — METOPROLOL SUCCINATE SCH MG: 25 TABLET, EXTENDED RELEASE ORAL at 10:54

## 2018-03-13 RX ADMIN — PANTOPRAZOLE SODIUM SCH MG: 40 TABLET, DELAYED RELEASE ORAL at 10:54

## 2018-03-13 RX ADMIN — LISINOPRIL SCH MG: 10 TABLET ORAL at 17:46

## 2018-03-13 RX ADMIN — ATORVASTATIN CALCIUM SCH MG: 40 TABLET, FILM COATED ORAL at 21:02

## 2018-03-13 RX ADMIN — OXYBUTYNIN CHLORIDE SCH MG: 5 TABLET ORAL at 14:38

## 2018-03-13 RX ADMIN — OXYBUTYNIN CHLORIDE SCH MG: 5 TABLET ORAL at 17:46

## 2018-03-13 RX ADMIN — APIXABAN SCH MG: 5 TABLET, FILM COATED ORAL at 17:46

## 2018-03-13 NOTE — PN
Subjective





- Date and Time Seen


Date: 03/13/18


Time: 12:57


Subjective Narrative: 





Breathing  better and no chest pain





Objective





- Review of Systems


Generalized/Overall Review: Reports: No Symptoms Reported


EENTM: Reports: No Symptoms Reported


Respiratory: Reports: No Symptoms Reported


Cardiac: Reports: No Symptoms Reported


Abdominal: Reports: No Symptoms Reported


Genitourinary Symptoms: Reports: No Symptoms Reported


Skin: Reports: No Symptoms Reported





- Vitals


Vitals: 


 Last Vital Signs











Temp  36.6 C   03/13/18 10:40


 


Pulse  82   03/13/18 10:54


 


Resp  18   03/13/18 10:40


 


BP  144/76   03/13/18 10:54


 


Pulse Ox  96   03/13/18 10:40














- Exam


Constitutional: Present: Alert, Oriented x3


ENT Exam: Present: normal ENT inspection


Respiratory: Present: lungs clear, normal breath sounds


Cardiovascular/Chest: Present: regular rate, rhythm


Abdomen: Present: Normal bowel sounds, soft, nontender


Extremity: Present: lower extremity edema





Assessment/Plan





- Problems/Diagnosis


(1) Pulmonary embolism


Problem: Acute   


Qualifiers: 


   Pulmonary embolism type: other   Chronicity: acute   Acute cor pulmonale 

presence: without acute cor pulmonale   Qualified Code(s): I26.99 - Other 

pulmonary embolism without acute cor pulmonale   


Narrative: 


order head ct to r/o bleeding aneurysm


then start anti coagulation








(2) Basilar artery aneurysm


Problem: Acute   


Narrative: 


head ct








(3) CHF (congestive heart failure)


Problem: Acute   


Narrative: 


will do echo ,more diuretic and repeat BNP

## 2018-03-13 NOTE — HP
Chief Complaint





- Chief Complaint


Date of Service: 18


Time of Service: 07:45


Chief Complaint: Shortness of breath


History of Present Illness: 





77-year-old  female presented to the hospital per private vehicle 

short of breath.  Evaluation in the emergency room determined that she had a 

small pleural effusion, possible pneumonia, and a right lower lobe pulmonary 

embolus.  Her d-dimer was elevated and so the CT with contrast was ordered 

determining the PE.  The nidus of the embolus is unknown.  She's had no 

previous pulmonary emboli which she is aware.  She is acutely short of breath 

so it appears this is an acute embolus.





- Narrative


Narrative: 





She is actually enjoyed relatively good health.  She does have osteoporosis.





- Patient's Past Medical History


Patient History - Medical: Osteoporosis


Patient History - Cardiac/Respiratory: Aneurysm, COPD, CVA/Stroke, Hypertension

, Hyperlipidemia, Valvular Heart Disease


Patient History - Cancer: No Hx of Cancer


Patient History - Surgical Procedures: Back Surgery, Cataracts, Colonoscopy, T 

& A, Other, Urology


Patient History - Other: None


LMP (females 10-50): Menopausal





- Family History


  ** Sister


Family History - Medical: Other


Family History - Cardiac/Respiratory: Asthma, Hypertension


Family History - Cancer: No pertinent family hx





  ** Mother


Family History - Medical: , No pertinent hx


Family History - Cardiac/Respiratory: Pulmonary Embolism


Family History - Cancer: No pertinent family hx





  ** Father


Family History - Medical: , Diabetes Type 2 Insulin Dependent


Family History - Cardiac/Respiratory: History Unknown


Family History - Cancer: No pertinent family hx





- Social History


Living Situations: home


Abuse History: No History of abuse


Psych History: No pertinent hx


Smoking Status: Current every day smoker


Have you smoked in the past 12 months: Yes


Alcohol Use: none


Drug Use: none





- Immunizations


Immunizations Up to Date: Yes


Hx Pneumococcal Vaccination: Yes


History of Influenza Vaccine: Yes





Review Of Systems (GEN)





- Review of Systems


Generalized/Overall Review: Present: Weakness


EENTM: Present: No Symptoms Reported


Respiratory: Present: Shortness of Breath


Cardiac: Present: No Symptoms Reported


Abdominal: Present: No Symptoms Reported


Genitourinary: Present: No Symptoms Reported


Musculoskeletal: Present: No Symptoms Reported


Neurological: Present: No Symptoms Reported


Skin: Present: No Symptoms Reported


Endocrine: Present: No Symptoms Reported


Misc: All systems neg except as marked


Additional Comments: 





Patient has recurrent every day smoker.


Immunizations: 


 IMMUNIZATION HX





Immunizations Up to Date         Yes


History of Influenza Vaccine     Yes


Hx Pneumococcal Vaccination      Yes








Allergies/Adverse Reactions: 


 Allergies











Allergy/AdvReac Type Severity Reaction Status Date / Time


 


ciprofloxacin [From Cipro] Allergy Unknown  Verified 17 16:11


 


ciprofloxacin HCl Allergy Unknown  Verified 17 16:11





[From Cipro]     


 


sulfamethoxazole Allergy Unknown  Verified 17 16:11





[From Bactrim]     


 


trimethoprim [From Bactrim] Allergy Unknown  Verified 17 16:11


 


Sulfa (Sulfonamide Allergy   Verified 17 16:11





Antibiotics)     











Home Medications: 


HOME MEDICATIONS





Oxybutynin Chloride 5 mg PO TID 13 [Last Taken Unknown]


Acetaminophen [Tylenol] 500 mg PO BID PRN 17 [Last Taken Unknown]


Aspirin [Aspirin EC] 81 mg PO DAILY 17 [Last Taken Unknown]


Atorvastatin Calcium [Lipitor] 40 mg PO HS 17 [Last Taken Unknown]


Calcium Carbonate/Vitamin D3 [Oyster Shell Calcium-Vit D Tab] 1 each PO DAILY  [Last Taken Unknown]


Clopidogrel Bisulfate [Plavix] 75 mg PO DAILY 17 [Last Taken Unknown]


Multivitamin [Multivitamins] 1 tab PO DAILY 17 [Last Taken Unknown]


Pantoprazole Sodium 40 mg PO DAILY 17 [Last Taken Unknown]











Exam





- Exam


Vital Signs: 


 Vital Signs - Last Taken











Temp  36.6 C   18 10:40


 


Pulse  82   18 10:54


 


Resp  18   18 10:40


 


BP  144/76   18 10:54


 


Pulse Ox  96   18 10:40











Constitutional: Present: Alert, Oriented x3, Cooperative, Well developed, Well 

nourished, No distress


ENT Exam: Present: normal ENT inspection, hearing grossly normal, pharynx normal


Eye Exam: bilateral eye: normal inspection, PERRL, EOMI


Neck: Present: non-tender, full range of motion, normal inspection, trachea 

midline


Back Exam: Present: normal inspection, no CVA tenderness, no vertebral 

tenderness


Respiratory: Present: chest non-tender, decreased breath sounds, inspiration.  

Absent: accessory muscle use, rhonchi, stridor, wheezing, expiration (prolonged)

, No rales, No wheezing, plerual rub


Cardiovascular/Chest: Present: normal peripheral pulses, regular rate, rhythm, 

no chest tenderness, edema - There is just a trace in both lower extremities 

mostly at the ankles.


Peripheral Pulses: carotid (R): 2+, carotid (L): 2+, femoral (R): 2+, femoral (L

): 2+, dorsalis-pedis (R): 2+, dorsalis-pedis (L): 2+, radial (R): 2+, radial (L

): 2+


Abdomen: Present: Normal bowel sounds, soft, nontender, nondistended, no 

rebound tenderness, no hepatospenomegaly, no masses.  Absent: obese, tender


/Rectal: Present: Exam deferred


Extremity: Present: normal range of motion, no calf tenderness, normal 

capillary refill, pelvis stable, lower extremity edema, pedal edema, other - 

Homans sign is negative.  Absent: claudication


Skin Exam: Present: other - Mildly ruborous appearance of the lower 

extremities.  There is mild edema which is usually worse by evening.


Lymphatic: Present: no adenopathy


Neurologic: Present: CNs II-XII nml as tested, no motor/sensory deficits, alert

, normal mood/affect, oriented x 3


Appearance: Present: appropriate appearance, appropriate insight, neat, no 

memory impairment


Eye contact: Present: cooperative, good eye contact, normal speech


Thoughts: Present: normal thought pattern, no apparent hallucination.  Absent: 

auditory hallucinations, delusions


Diagnostic Studies: 


 Laboratory Results











WBC  11.7 K/mm3 (4.0-10.5)  H  18  20:32    


 


RBC  4.45 M/mm3 (4.2-5.4)   18  20:32    


 


Hgb  14.0 gm/dL (12.5-16.0)   18  20:32    


 


Hct  42.2 % (37.0-47.0)   18  20:32    


 


MCV  94.8 fl ()   18  20:32    


 


MCH  31.5 pg (27-31)  H  18  20:32    


 


MCHC  33.2 g/dl (32-36)   18  20:32    


 


RDW  15.3 % (11.5-14.0)  H  18  20:32    


 


Plt Count  292 K/mm3 (150-450)   18  20:32    


 


MPV  10.5 fl (6.0-9.5)  H  18  20:32    


 


Immature Gran % (Auto)  0.30 % (0.001-0.429)   18  20:    


 


Immature Gran # (Auto)  0.04 K/mm3 (0.000-0.0310)  H  18  20:32    


 


Neutrophils %  78.7 % (42-75.0)  H  18  20:32    


 


Lymphocytes %  11.7 % (20-51)  L  18  20:    


 


Monocytes %  7.9 % (0.0-9)   18  20:    


 


Eosinophils %  1.0 % (0.0-3.0)   18  20:    


 


Basophils %  0.4 % (0.0-1.0)   18  20:    


 


Nucleated RBC %  0.0 k/mm3 (0-1)   18  20:    


 


Neutrophils #  9.2 K/mm3 (1.3-6.0)  H  18  20:32    


 


Lymphocytes #  1.37 k/mm3 (1.5-3.5)  L  18  20:    


 


Monocytes #  0.9 k/mm3 (0.0-1.0)   18  20:    


 


Eosinophils #  0.1 k/mm3 (0.0-0.7)   18  20:    


 


Absolute Basophils  0.1 k/mm3 (0.0-0.1)   18  20:32    


 


D-Dimer  1.50 mg/L (0.19-0.49)  H  18  20:32    


 


Sodium  146 mmol/L (132-142)  H  18  20:32    


 


Plasma Sodium  146 mmol/L (130-142)  H  18  20:32    


 


Potassium  4.4 mmol/L (3.4-4.6)   18  20:32    


 


Chloride  106 mmol/L ()   18  20:32    


 


Carbon Dioxide  27.7 mmol/L (24-32.6)   18  20:32    


 


Anion Gap  16.7 mmol/L (6.8-13.8)  H  18  20:32    


 


BUN  22 mg/dL (3-23)   18  20:32    


 


Creatinine  0.99 mg/dL (0.4-1.4)   18  20:32    


 


Est GFR (Non-Af Amer)  58 mL/min ()  L  18  20:32    


 


BUN/Creatinine Ratio  22.2  (9.0-21.6)  H  18  20:32    


 


Random Glucose  123 mg/dL ()  H  18  20:32    


 


Calcium  9.5 mg/dL (7.9-10.9)   18  20:32    


 


Calcium Adj for Albumin  9.7 mg/dL (8.4-10.2)   18  20:32    


 


Total Bilirubin  0.3 mg/dL (0.0-1.1)   18  20:32    


 


AST  33 U/L (0-48)   18  20:32    


 


ALT  59 U/L (19-67)   18  20:32    


 


Alkaline Phosphatase  110 U/L ()   18  20:32    


 


B-Natriuretic Peptide  02663 pg/mL (5-550)  H  18  20:32    


 


Total Protein  7.2 gm/dL (6.2-8.2)   18  20:32    


 


Albumin  3.4 gm/dl (3.4-5.0)   18  20:32    


 


Troponin I  0.035 ng/ml (0.00-0.10)   18  12:34    














Assessment/Plan





- Narrative


Narrative: 





Admit for observation.  Monitor pulse ox and telemetry.  Note: Her monitor 

pattern through the night he shown 2 episodes of V. tach one of 9 beats and the 

other 13 beats salvos.  It is not sustained post past that and then returns to 

normal sinus rhythm.  I will start her on a beta blocker once daily.  I'll also 

change her from Lovenox to xarelto 15 mg twice a day for 3 weeks.  That should 

convert to 20 mg per day thereafter.  She is a patient of Dr. Adams will 

assume her care today and discharge her as he deems appropriate.





Ignacio Rey D.O.


2018


Dictation time 1356 PM





- Assessment/Plan


(1) Pulmonary embolism


Problem: Acute   


Qualifiers: 


   Pulmonary embolism type: other   Chronicity: acute   Acute cor pulmonale 

presence: without acute cor pulmonale   Qualified Code(s): I26.99 - Other 

pulmonary embolism without acute cor pulmonale   





(2) Ventricular tachycardia (paroxysmal)


Problem: Acute   





(3) Abnormal EKG


Problem: Acute

## 2018-03-14 LAB
ALBUMIN   *: 2.6 GM/DL (ref 3.4–5)
ALKALINE PHOSPHATASE     *: 96 U/L (ref 50–170)
ALT SERPL-CCNC: 62 U/L (ref 19–67)
ANION GAP SERPL CALC-SCNC: 12.7 MMOL/L (ref 6.8–13.8)
AST SERPL-CCNC: 35 U/L (ref 0–48)
BILIRUB SERPL-MCNC: 0.3 MG/DL (ref 0–1.1)
BUN SERPL-MCNC: 23 MG/DL (ref 3–23)
BUN/CREAT SERPL: 26.1 (ref 9–21.6)
CA. CORRECTED FOR ALBUMIN: 9 MG/DL (ref 8.4–10.2)
CALCIUM       *: 8.2 MG/DL (ref 7.9–10.9)
CHLORIDE SERPL-SCNC: 105 MMOL/L (ref 97–106)
CO2 SERPL-SCNC: 28.5 MMOL/L (ref 24–32.6)
GLUCOSE   *: 138 MG/DL (ref 70–110)
HCT VFR BLD CALC: 36.3 % (ref 37–47)
HGB BLD-MCNC: 12.2 GM/DL (ref 12.5–16)
MCH RBC QN AUTO: 31.5 PG (ref 27–31)
NRBC#: 0 K/MM3 (ref 0–1)
PLATELET # BLD: 258 K/MM3 (ref 150–450)
POTASSIUM SERPL-SCNC: 3.2 MMOL/L (ref 3.4–4.6)
PROT SERPL-MCNC: 5.8 GM/DL (ref 6.2–8.2)
RBC # BLD AUTO: 3.87 M/MM3 (ref 4.2–5.4)
SODIUM SERPL-SCNC: 143 MMOL/L (ref 132–142)
WBC # BLD AUTO: 6.9 K/MM3 (ref 4–10.5)

## 2018-03-14 RX ADMIN — METOPROLOL SUCCINATE SCH MG: 25 TABLET, EXTENDED RELEASE ORAL at 10:00

## 2018-03-14 RX ADMIN — Medication SCH TAB: at 10:01

## 2018-03-14 RX ADMIN — ATORVASTATIN CALCIUM SCH MG: 40 TABLET, FILM COATED ORAL at 20:10

## 2018-03-14 RX ADMIN — OXYBUTYNIN CHLORIDE SCH MG: 5 TABLET ORAL at 10:01

## 2018-03-14 RX ADMIN — APIXABAN SCH MG: 5 TABLET, FILM COATED ORAL at 20:10

## 2018-03-14 RX ADMIN — PANTOPRAZOLE SODIUM SCH MG: 40 TABLET, DELAYED RELEASE ORAL at 10:01

## 2018-03-14 RX ADMIN — ASPIRIN SCH MG: 81 TABLET, COATED ORAL at 10:01

## 2018-03-14 RX ADMIN — APIXABAN SCH MG: 5 TABLET, FILM COATED ORAL at 10:00

## 2018-03-14 RX ADMIN — SPIRONOLACTONE SCH MG: 25 TABLET, FILM COATED ORAL at 10:01

## 2018-03-14 RX ADMIN — OXYBUTYNIN CHLORIDE SCH MG: 5 TABLET ORAL at 17:08

## 2018-03-14 RX ADMIN — LISINOPRIL SCH MG: 10 TABLET ORAL at 10:16

## 2018-03-14 RX ADMIN — OXYBUTYNIN CHLORIDE SCH MG: 5 TABLET ORAL at 13:55

## 2018-03-14 NOTE — PN
Subjective





- Date and Time Seen


Date: 03/14/18


Time: 09:07


Subjective Narrative: 





No chest pain feeling and breathing better





Objective





- Review of Systems


Generalized/Overall Review: Reports: No Symptoms Reported


EENTM: Reports: No Symptoms Reported


Respiratory: Reports: No Symptoms Reported


Cardiac: Reports: No Symptoms Reported


Abdominal: Reports: No Symptoms Reported


Genitourinary Symptoms: Reports: Incontinent


Misc: All systems neg except as marked





- Vitals


Vitals: 


 Last Vital Signs











Temp  36.7 C   03/14/18 07:37


 


Pulse  84   03/14/18 07:37


 


Resp  20   03/14/18 07:37


 


BP  116/68   03/14/18 07:37


 


Pulse Ox  94   03/14/18 07:37














- Abnormal Lab Findings


Abnormal Lab Findings: 


 Abnormal Lab Results











  03/14/18 03/14/18 Range/Units





  06:14 06:14 


 


RBC  3.87 L   (4.2-5.4)  M/mm3


 


Hgb  12.2 L   (12.5-16.0)  gm/dL


 


Hct  36.3 L   (37.0-47.0)  %


 


MCH  31.5 H   (27-31)  pg


 


RDW  15.3 H   (11.5-14.0)  %


 


MPV  10.5 H   (6.0-9.5)  fl


 


Lymphocytes %  16.8 L   (20-51)  %


 


Monocytes %  9.6 H   (0.0-9)  %


 


Lymphocytes #  1.16 L   (1.5-3.5)  k/mm3


 


Sodium   143 H  (132-142)  mmol/L


 


Plasma Sodium   144 H  (130-142)  mmol/L


 


Potassium   3.2 L D  (3.4-4.6)  mmol/L


 


BUN/Creatinine Ratio   26.1 H  (9.0-21.6)  


 


Random Glucose   138 H  ()  mg/dL


 


B-Natriuretic Peptide   6748 H  (5-550)  pg/mL


 


Total Protein   5.8 L  (6.2-8.2)  gm/dL


 


Albumin   2.6 L  (3.4-5.0)  gm/dl














- Exam


Constitutional: Present: Alert, Oriented x3, Cooperative


ENT Exam: Present: normal ENT inspection


Respiratory: Present: no respiratory distress, rales


Cardiovascular/Chest: Present: regular rate, rhythm, no JVD


Abdomen: Present: soft, nontender


Extremity: Present: no pedal edema





Assessment/Plan





- Problems/Diagnosis


(1) Pulmonary embolism


Problem: Acute   


Qualifiers: 


   Pulmonary embolism type: other   Chronicity: acute   Acute cor pulmonale 

presence: without acute cor pulmonale   Qualified Code(s): I26.99 - Other 

pulmonary embolism without acute cor pulmonale   


Narrative: 


Continue treatment of pulmonary emboli with eliquiz








(2) Basilar artery aneurysm


Problem: Acute   


Narrative: 


I have spoken to Dr. Candido Palacios neurosurgeon from [facility] who 

agree for patient to continue Eliquiz for PE inspite of her brain aneurysm








(3) CHF (congestive heart failure)


Problem: Acute   


Narrative: 


Echocardiogram showed ejection fraction of 20% so he will be started on 

lisinopril and Aldactone and IV Lasix

## 2018-03-15 LAB
ALBUMIN   *: 2.7 GM/DL (ref 3.4–5)
ALKALINE PHOSPHATASE     *: 97 U/L (ref 50–170)
ALT SERPL-CCNC: 53 U/L (ref 19–67)
ANION GAP SERPL CALC-SCNC: 12.5 MMOL/L (ref 6.8–13.8)
AST SERPL-CCNC: 33 U/L (ref 0–48)
BILIRUB SERPL-MCNC: 0.3 MG/DL (ref 0–1.1)
BNP   *: 4672 PG/ML (ref 5–550)
BUN SERPL-MCNC: 23 MG/DL (ref 3–23)
BUN/CREAT SERPL: 25 (ref 9–21.6)
CA. CORRECTED FOR ALBUMIN: 9.3 MG/DL (ref 8.4–10.2)
CALCIUM       *: 8.6 MG/DL (ref 7.9–10.9)
CHLORIDE SERPL-SCNC: 103 MMOL/L (ref 97–106)
CO2 SERPL-SCNC: 29.6 MMOL/L (ref 24–32.6)
GLUCOSE   *: 111 MG/DL (ref 70–110)
POTASSIUM SERPL-SCNC: 4.1 MMOL/L (ref 3.4–4.6)
PROT SERPL-MCNC: 6.1 GM/DL (ref 6.2–8.2)
SODIUM SERPL-SCNC: 141 MMOL/L (ref 132–142)

## 2018-03-15 RX ADMIN — OXYBUTYNIN CHLORIDE SCH MG: 5 TABLET ORAL at 08:16

## 2018-03-15 RX ADMIN — LISINOPRIL SCH MG: 10 TABLET ORAL at 08:16

## 2018-03-15 RX ADMIN — APIXABAN SCH: 5 TABLET, FILM COATED ORAL at 09:20

## 2018-03-15 RX ADMIN — METOPROLOL SUCCINATE SCH MG: 25 TABLET, EXTENDED RELEASE ORAL at 08:16

## 2018-03-15 RX ADMIN — OXYBUTYNIN CHLORIDE SCH MG: 5 TABLET ORAL at 16:30

## 2018-03-15 RX ADMIN — OXYBUTYNIN CHLORIDE SCH MG: 5 TABLET ORAL at 14:27

## 2018-03-15 RX ADMIN — SPIRONOLACTONE SCH MG: 25 TABLET, FILM COATED ORAL at 08:16

## 2018-03-15 RX ADMIN — APIXABAN SCH MG: 5 TABLET, FILM COATED ORAL at 08:16

## 2018-03-15 RX ADMIN — ATORVASTATIN CALCIUM SCH MG: 40 TABLET, FILM COATED ORAL at 22:32

## 2018-03-15 RX ADMIN — FUROSEMIDE SCH MG: 40 TABLET ORAL at 10:02

## 2018-03-15 RX ADMIN — APIXABAN SCH MG: 5 TABLET, FILM COATED ORAL at 22:31

## 2018-03-15 RX ADMIN — Medication SCH TAB: at 08:16

## 2018-03-15 RX ADMIN — PANTOPRAZOLE SODIUM SCH MG: 40 TABLET, DELAYED RELEASE ORAL at 08:16

## 2018-03-15 RX ADMIN — ASPIRIN SCH MG: 81 TABLET, COATED ORAL at 08:16

## 2018-03-16 VITALS — DIASTOLIC BLOOD PRESSURE: 60 MMHG | SYSTOLIC BLOOD PRESSURE: 125 MMHG

## 2018-03-16 LAB
ALBUMIN   *: 2.7 GM/DL (ref 3.4–5)
ALKALINE PHOSPHATASE     *: 92 U/L (ref 50–170)
ALT SERPL-CCNC: 43 U/L (ref 19–67)
ANION GAP SERPL CALC-SCNC: 12 MMOL/L (ref 6.8–13.8)
AST SERPL-CCNC: 22 U/L (ref 0–48)
BILIRUB SERPL-MCNC: 0.3 MG/DL (ref 0–1.1)
BUN SERPL-MCNC: 29 MG/DL (ref 3–23)
BUN/CREAT SERPL: 30.2 (ref 9–21.6)
CA. CORRECTED FOR ALBUMIN: 9.1 MG/DL (ref 8.4–10.2)
CALCIUM       *: 8.4 MG/DL (ref 7.9–10.9)
CHLORIDE SERPL-SCNC: 104 MMOL/L (ref 97–106)
CO2 SERPL-SCNC: 30.4 MMOL/L (ref 24–32.6)
GLUCOSE   *: 141 MG/DL (ref 70–110)
POTASSIUM SERPL-SCNC: 3.4 MMOL/L (ref 3.4–4.6)
PROT SERPL-MCNC: 5.9 GM/DL (ref 6.2–8.2)
SODIUM SERPL-SCNC: 143 MMOL/L (ref 132–142)

## 2018-03-16 RX ADMIN — FUROSEMIDE SCH MG: 40 TABLET ORAL at 10:43

## 2018-03-16 RX ADMIN — PANTOPRAZOLE SODIUM SCH MG: 40 TABLET, DELAYED RELEASE ORAL at 08:32

## 2018-03-16 RX ADMIN — ASPIRIN SCH MG: 81 TABLET, COATED ORAL at 08:31

## 2018-03-16 RX ADMIN — SPIRONOLACTONE SCH MG: 25 TABLET, FILM COATED ORAL at 08:31

## 2018-03-16 RX ADMIN — Medication SCH TAB: at 08:30

## 2018-03-16 RX ADMIN — APIXABAN SCH MG: 5 TABLET, FILM COATED ORAL at 08:30

## 2018-03-16 RX ADMIN — METOPROLOL SUCCINATE SCH MG: 25 TABLET, EXTENDED RELEASE ORAL at 08:31

## 2018-03-16 RX ADMIN — OXYBUTYNIN CHLORIDE SCH MG: 5 TABLET ORAL at 08:30

## 2018-03-16 RX ADMIN — LISINOPRIL SCH MG: 10 TABLET ORAL at 08:32

## 2018-03-16 NOTE — DS
(1) Pulmonary embolism


Problem: Acute   


Qualifiers: 


   Pulmonary embolism type: other   Chronicity: acute   Acute cor pulmonale 

presence: without acute cor pulmonale   Qualified Code(s): I26.99 - Other 

pulmonary embolism without acute cor pulmonale   





(2) Basilar artery aneurysm


Problem: Chronic   





(3) CHF (congestive heart failure)


Problem: Acute   


Description of Stay: 





77-year-old white female was admitted because of chest pain and shortness of 

breath chest x-ray showed cardiomegaly and right-sided pleural effusion chest 

CT showed pulmonary emboli.  BNP elevated at 12,000; she was treated.  With 

Lovenox and then was changed to adequately use.  Echocardiogram showed 

cardiomyopathy with ejection fraction of 20% he was given IV Lasix and was 

started .  On metoprolol lisinopril and Aldactone.  Cardiology consultation 

with Dr. Cespedes was made and he agree on the treatment plan.  Because of this 

brain aneurysm I consulted on the telephone her neurosurgeon Dr. Candido Palacios 

[facility] regarding anticoagulation on her situation and according to 

him he agree On starting her on anticoagulation.





Chest x-ray showed resolution of CHF and BNP has decreased more than 50% from 

her admission.  On the day of discharge she is feeling and breathing better so 

she will be transferred to [facility]


Procedures Performed: none


Discharge Location: [facility]


Disposition: SNF


Condition: Fair


Discharge Activity: Activity as tolerated


Discharge Diet: Low salt


Referrals: 


Harvey Adams MD [Primary Care Provider] - 


Additional Patient Instructions (free text): 


Follow up with Dr. Adams in 1 week


-Please make TCM appointment unless nursing home discharge. Thank you! Marimar @ 

extension [phone]


Prescriptions (Any new or edited meds): 


Apixaban [Eliquis] 5 mg PO BID #60 tablet


Diphenoxylate HCl/Atrop Sulf [Lomotil] 2.5 mg PO QID PRN #30 tablet


 PRN Reason: Diarrhea


Furosemide [Lasix] 20 mg PO DAILY@1100 #90 tablet


Lisinopril [Zestril] 10 mg PO DAILY #90 tablet


Metoprolol Succinate [Toprol Xl] 25 mg PO DAILY #30 tablet.sa


Psyllium Husk (with Sugar) [Metamucil] 1 pkt PO BID #60 packet


Spironolactone [Aldactone] 25 mg PO DAILY #90 tablet


Complete Home Medications List: 


Complete Home Medication List:





Oxybutynin Chloride 5 mg PO TID 01/03/13 


Acetaminophen [Tylenol] 500 mg PO BID PRN 12/29/17 


Aspirin [Aspirin EC] 81 mg PO DAILY 12/29/17 


Atorvastatin Calcium [Lipitor] 40 mg PO HS 12/29/17 


Calcium Carbonate/Vitamin D3 [Oyster Shell Calcium-Vit D Tab] 1 each PO DAILY 12 /29/17 


Multivitamin [Multivitamins] 1 tab PO DAILY 12/29/17 


Pantoprazole Sodium 40 mg PO DAILY 12/29/17 


Apixaban [Eliquis] 5 mg PO BID #60 tablet 03/16/18 


Diphenoxylate HCl/Atrop Sulf [Lomotil] 2.5 mg PO QID PRN #30 tablet 03/16/18 


Furosemide [Lasix] 20 mg PO DAILY@1100 #90 tablet 03/16/18 


Lisinopril [Zestril] 10 mg PO DAILY #90 tablet 03/16/18 


Metoprolol Succinate [Toprol Xl] 25 mg PO DAILY #30 tablet.sa 03/16/18 


Psyllium Husk (with Sugar) [Metamucil] 1 pkt PO BID #60 packet 03/16/18 


Spironolactone [Aldactone] 25 mg PO DAILY #90 tablet 03/16/18